# Patient Record
(demographics unavailable — no encounter records)

---

## 2025-04-17 NOTE — REVIEW OF SYSTEMS
Alert-The patient is alert, awake and responds to voice. The patient is oriented to time, place, and person. The triage nurse is able to obtain subjective information. [Negative] : Musculoskeletal

## 2025-04-17 NOTE — LETTER BODY
[Dear  ___] : Dear  [unfilled], [I recently saw our patient [unfilled] for a follow-up visit.] : I recently saw our patient, [unfilled] for a follow-up visit. [Attached please find my note.] : Attached please find my note. [FreeTextEntry2] : She underwent followup exam and there is currently no evidence of dysplasia.  She was advised to see me in 6m. She will continue to see you for routine gynecologic matters. I will keep you updated on her progress. Thank you again for referring this silvestre patient.   [FreeTextEntry1] : pap/hpv

## 2025-04-17 NOTE — HISTORY OF PRESENT ILLNESS
[FreeTextEntry1] : Ms. Romo is a  postmenopausal female, with a LMP in , status post laparoscopic total hysterectomy secondary to fibroids (Dr. Gómez- [facility],  ; tubes and ovaries were left in situ) referred by Dr. 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  She denies any prior history of abnormal PAP before or after her hysterectomy until .   2020 Pap ([facility])      LSIL, HPV E6+E7 MRNA detected, HPV 16 E6+E7 MRNA detected, HPV 18+45 E6+E7 MRNA not detected  2020 Vaginal colposcopy ([facility]) **SLIDES REQUESTED**       A. 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 vagina cuff, biopsy:small focus mild dysplasia/low grade vaginal intraepithelial neoplasia, Grade 1                      (VaIN 1)       D. 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 Raised warty appearance, ranging in size from 2-8mm. No evidence invasive carcinoma.  20 colposcopy/laser ablation of vaginal dysplasia. 21:  Vaginal pap LSIL;  +HPVmRNA; +HPV16; -HPV 18/45 21 s/p Colposcopy; laser ablation of vaginal dysplasia.   2/3/22 Vag PAP negative/HRHPV (-) 22: Vaginal PAP negative/HRHPV (-) 23: pap- (-) HRHPV (-) 2023 PAP negative/HRHPV (-) 2024 PAP negative/HRHPV (-)  PMH: HTN PSH: robotic total hysterectomy, right rotator cuff repair  Family history cancer: denies  SH; non smoker,   She reported a >12-month history of intermittent medial right breast pain that radiates to the nipple,eventually saw breast surgery and is being folllowed, normal breast imaging.  She denies VB, VD or any other associated signs and symptoms.    Health Maintenance: Pap - see above  Mammo - followed by breast surgeon Dr Reyes; BIRADS2 10/2024, f/u 1 year with mammo/sono Colonoscopy - 2018 normal as per patient, follow up as per GI   Referred by/GYN: Dr. Jane Wild; sees Dr. Ema Shane due to insurance change PCP/GI: Dr. Wilkins Endocrinologist: Dr. Tony Felix

## 2025-04-17 NOTE — DISCUSSION/SUMMARY
[Reviewed Clinical Lab Test(s)] : Results of clinical tests were reviewed. [FreeTextEntry1] : - f/u vag PAP/HRHPV  - breast screening per Dr. Reyes (surg onc) - exam findings d/w patient - no evidence of dysplasia at current time - reviewed the management of vaginal dysplasia and risk of progression to invasive carcinoma - reviewed risk of recurrent or persistent dysplasia and importance of close followup - reviewed the etiology and natural history of HPV-related dysplasia and that the treatment will not cure the HPV virus. - HM reviewed..  - she is advised to see me in 6 months for followup.

## 2025-04-17 NOTE — PHYSICAL EXAM
[MA] : MA [Absent] : Adnexa(ae): Absent [Normal] : Anus and perineum: Normal sphincter tone, no masses, no prolapse. [Fully active, able to carry on all pre-disease performance without restriction] : Status 0 - Fully active, able to carry on all pre-disease performance without restriction [FreeTextEntry2] : Yolette [de-identified] : no masses, tenderness, adenopathy nor nipple discharge [de-identified] : no gross lesions; see colpo note [de-identified] : adnexa are nonpalpable

## 2025-04-17 NOTE — PROCEDURE
[Colposcopy] : colposcopy [Patient] : the patient [Verbal Consent] : verbal consent was obtained prior to the procedure and is detailed in the patient's record [Site Verification] : site verification performed. [Time Out] : Time Out Procedure:The following was confirmed prior to the procedure-Correct patient identity, correct site, agreement on the procedure to be done and correct patient position. [No Abnormalities] : no abnormalities seen [No Complications] : none [Tolerated Well] : the patient tolerated the procedure well [Post procedure instructions and information given] : post procedure instructions and information given and reviewed with patient. [1] : 1 [FreeTextEntry9] : vaginal dysplasia

## 2025-04-17 NOTE — ASSESSMENT
[FreeTextEntry1] : 55y/o with h/o VAIN2, s/p laser ablation, no evidence of recurrent dysplasia on followup exam.